# Patient Record
Sex: FEMALE | Race: WHITE | NOT HISPANIC OR LATINO | URBAN - METROPOLITAN AREA
[De-identification: names, ages, dates, MRNs, and addresses within clinical notes are randomized per-mention and may not be internally consistent; named-entity substitution may affect disease eponyms.]

---

## 2018-06-26 ENCOUNTER — OFFICE VISIT (OUTPATIENT)
Dept: URBAN - METROPOLITAN AREA CLINIC 23 | Facility: CLINIC | Age: 68
End: 2018-06-26
Payer: COMMERCIAL

## 2018-06-26 PROCEDURE — 92014 COMPRE OPH EXAM EST PT 1/>: CPT | Performed by: OPTOMETRIST

## 2018-06-26 ASSESSMENT — INTRAOCULAR PRESSURE
OD: 12
OS: 12

## 2018-06-26 NOTE — IMPRESSION/PLAN
Impression: Type 2 diabetes mellitus without complications: J88.3. Plan: Discussed diagnosis in detail with patient. Advised and emphasized patient of blood sugar control. Discussed risks of progression. Poor compliance can lead to blindness. OCT macula performed and reviewed with patient today- no signs of diabetic retinopathy. Reassured patient of condition and treatment. Will continue to observe condition and or symptoms.

## 2020-10-28 ENCOUNTER — OFFICE VISIT (OUTPATIENT)
Dept: URBAN - METROPOLITAN AREA CLINIC 23 | Facility: CLINIC | Age: 70
End: 2020-10-28
Payer: MEDICARE

## 2020-10-28 DIAGNOSIS — H43.393 OTHER VITREOUS OPACITIES, BILATERAL: ICD-10-CM

## 2020-10-28 DIAGNOSIS — H25.13 AGE-RELATED NUCLEAR CATARACT, BILATERAL: ICD-10-CM

## 2020-10-28 PROCEDURE — 92134 CPTRZ OPH DX IMG PST SGM RTA: CPT | Performed by: OPTOMETRIST

## 2020-10-28 PROCEDURE — 92014 COMPRE OPH EXAM EST PT 1/>: CPT | Performed by: OPTOMETRIST

## 2020-10-28 ASSESSMENT — KERATOMETRY
OS: 42.50
OD: 42.75

## 2020-10-28 ASSESSMENT — INTRAOCULAR PRESSURE
OD: 13
OS: 13

## 2020-10-28 ASSESSMENT — VISUAL ACUITY
OS: 20/25
OD: 20/25

## 2020-10-28 NOTE — IMPRESSION/PLAN
Impression: Type 2 diabetes mellitus without complications: F10.2 Bilateral. Plan: Discussed diagnosis in detail with patient. Advised and emphasized patient of blood sugar control. Discussed risks of progression. Poor compliance can lead to blindness. OCT macula performed and reviewed with patient today. Reassured patient of condition and treatment. Will continue to observe condition and or symptoms.

## 2020-11-18 ENCOUNTER — OFFICE VISIT (OUTPATIENT)
Dept: URBAN - METROPOLITAN AREA CLINIC 22 | Facility: CLINIC | Age: 70
End: 2020-11-18
Payer: COMMERCIAL

## 2020-11-18 PROCEDURE — 92014 COMPRE OPH EXAM EST PT 1/>: CPT | Performed by: OPTOMETRIST

## 2020-11-18 PROCEDURE — 92015 DETERMINE REFRACTIVE STATE: CPT | Performed by: OPTOMETRIST

## 2020-11-18 ASSESSMENT — VISUAL ACUITY
OS: 20/20
OD: 20/20

## 2020-11-18 ASSESSMENT — INTRAOCULAR PRESSURE
OS: 12
OD: 14

## 2020-11-18 NOTE — IMPRESSION/PLAN
Impression: Age-related nuclear cataract, bilateral: H25.13 Bilateral. Plan: referred for sx as necessary

## 2021-05-14 ENCOUNTER — OFFICE VISIT (OUTPATIENT)
Dept: URBAN - METROPOLITAN AREA CLINIC 23 | Facility: CLINIC | Age: 71
End: 2021-05-14
Payer: COMMERCIAL

## 2021-05-14 DIAGNOSIS — H25.813 COMBINED FORMS OF AGE-RELATED CATARACT, BILATERAL: Primary | ICD-10-CM

## 2021-05-14 DIAGNOSIS — H04.123 DRY EYE SYNDROME OF BILATERAL LACRIMAL GLANDS: ICD-10-CM

## 2021-05-14 PROCEDURE — 99203 OFFICE O/P NEW LOW 30 MIN: CPT | Performed by: OPHTHALMOLOGY

## 2021-05-14 ASSESSMENT — KERATOMETRY
OD: 42.50
OS: 42.38

## 2021-05-14 ASSESSMENT — VISUAL ACUITY
OS: 20/25
OD: 20/25

## 2021-05-14 ASSESSMENT — INTRAOCULAR PRESSURE
OD: 13
OS: 12

## 2021-05-14 NOTE — IMPRESSION/PLAN
Impression: Dry eye syndrome of bilateral lacrimal glands: H04.123. Condition: unstable. Plan: Discussed diagnosis in detail with patient. Recommend frequent use of artificial tears for comfort daily and frequently. If patient feels she has allergy issues she can use OTC Zaditor.

## 2021-05-14 NOTE — IMPRESSION/PLAN
Impression: Combined forms of age-related cataract, bilateral: H25.813. Condition: established, worsening. Symptoms: could improve with surgery. Plan: Discussed diagnosis in detail with patient. No surgical treatment currently recommended. The patient will monitor vision changes and contact us with any decrease in vision. Recommend frequent use of artificial tears especially while reading. Recommend wearing yellow-orange tint glasses at night while driving to reduce glare.

## 2022-02-11 ENCOUNTER — OFFICE VISIT (OUTPATIENT)
Dept: URBAN - METROPOLITAN AREA CLINIC 23 | Facility: CLINIC | Age: 72
End: 2022-02-11
Payer: COMMERCIAL

## 2022-02-11 DIAGNOSIS — E11.9 TYPE 2 DIABETES MELLITUS W/O COMPLICATION: Primary | ICD-10-CM

## 2022-02-11 DIAGNOSIS — H43.812 VITREOUS DEGENERATION, LEFT EYE: ICD-10-CM

## 2022-02-11 PROCEDURE — 99213 OFFICE O/P EST LOW 20 MIN: CPT | Performed by: OPTOMETRIST

## 2022-02-11 ASSESSMENT — INTRAOCULAR PRESSURE
OS: 9
OD: 11

## 2022-02-11 ASSESSMENT — KERATOMETRY
OD: 42.63
OS: 42.50

## 2022-02-11 NOTE — IMPRESSION/PLAN
Impression: Type 2 diabetes mellitus w/o complication: G08.9. Plan: Discussed diagnosis in detail with patient. Advised and emphasized patient of blood sugar control. Discussed risks of progression. Poor compliance can lead to blindness. Optos performed and reviewed with patient today. Reassured patient of condition and treatment. Will continue to observe condition and or symptoms.

## 2022-02-11 NOTE — IMPRESSION/PLAN
Impression: Combined forms of age-related cataract, bilateral: H25.813. Plan: Discussed diagnosis in detail with patient. Cataracts affecting vision some, but no surgery is currently recommended. The patient will monitor vision changes and contact us with any decrease in vision. Continue to monitor. 

RTC 1-2 weeks for refraction

## 2022-02-15 ENCOUNTER — OFFICE VISIT (OUTPATIENT)
Dept: URBAN - METROPOLITAN AREA CLINIC 23 | Facility: CLINIC | Age: 72
End: 2022-02-15
Payer: COMMERCIAL

## 2022-02-15 DIAGNOSIS — H52.03 HYPERMETROPIA, BILATERAL: Primary | ICD-10-CM

## 2022-02-15 PROCEDURE — 92014 COMPRE OPH EXAM EST PT 1/>: CPT | Performed by: OPTOMETRIST

## 2022-02-15 ASSESSMENT — VISUAL ACUITY
OD: 20/25
OS: 20/30

## 2023-03-20 ENCOUNTER — OFFICE VISIT (OUTPATIENT)
Dept: URBAN - METROPOLITAN AREA CLINIC 23 | Facility: CLINIC | Age: 73
End: 2023-03-20
Payer: COMMERCIAL

## 2023-03-20 DIAGNOSIS — H43.812 VITREOUS DEGENERATION, LEFT EYE: ICD-10-CM

## 2023-03-20 DIAGNOSIS — H25.813 COMBINED FORMS OF AGE-RELATED CATARACT, BILATERAL: ICD-10-CM

## 2023-03-20 DIAGNOSIS — E11.9 TYPE 2 DIABETES MELLITUS W/O COMPLICATION: Primary | ICD-10-CM

## 2023-03-20 PROCEDURE — 99213 OFFICE O/P EST LOW 20 MIN: CPT | Performed by: OPTOMETRIST

## 2023-03-20 ASSESSMENT — INTRAOCULAR PRESSURE
OD: 11
OS: 12

## 2023-03-20 ASSESSMENT — KERATOMETRY
OD: 247.75
OS: 42.50

## 2023-03-20 NOTE — IMPRESSION/PLAN
Impression: Type 2 diabetes mellitus w/o complication: A52.1. Plan: Discussed diagnosis in detail with patient. Advised and emphasized patient of blood sugar control. Discussed risks of progression. Poor compliance can lead to blindness. Optos performed and reviewed with patient today. Reassured patient of condition and treatment. Will continue to observe condition and or symptoms.